# Patient Record
Sex: FEMALE | Race: WHITE | NOT HISPANIC OR LATINO | URBAN - METROPOLITAN AREA
[De-identification: names, ages, dates, MRNs, and addresses within clinical notes are randomized per-mention and may not be internally consistent; named-entity substitution may affect disease eponyms.]

---

## 2017-01-30 ENCOUNTER — APPOINTMENT (RX ONLY)
Dept: URBAN - METROPOLITAN AREA CLINIC 74 | Facility: CLINIC | Age: 72
Setting detail: DERMATOLOGY
End: 2017-01-30

## 2017-01-30 DIAGNOSIS — L30.8 OTHER SPECIFIED DERMATITIS: ICD-10-CM

## 2017-01-30 PROBLEM — L30.9 DERMATITIS, UNSPECIFIED: Status: ACTIVE | Noted: 2017-01-30

## 2017-01-30 PROCEDURE — 99213 OFFICE O/P EST LOW 20 MIN: CPT

## 2017-01-30 PROCEDURE — ? PRESCRIPTION

## 2017-01-30 PROCEDURE — ? TREATMENT REGIMEN

## 2017-01-30 PROCEDURE — ? COUNSELING

## 2017-01-30 RX ORDER — FLURANDRENOLIDE 4 UG/CM2
TAPE TOPICAL
Qty: 1 | Refills: 1 | Status: ERX | COMMUNITY
Start: 2017-01-30

## 2017-01-30 RX ADMIN — FLURANDRENOLIDE: 4 TAPE TOPICAL at 19:54

## 2017-01-30 ASSESSMENT — LOCATION SIMPLE DESCRIPTION DERM
LOCATION SIMPLE: LEFT INDEX FINGER
LOCATION SIMPLE: LEFT RING FINGER
LOCATION SIMPLE: RIGHT RING FINGER
LOCATION SIMPLE: RIGHT INDEX FINGER
LOCATION SIMPLE: LEFT MIDDLE FINGER
LOCATION SIMPLE: RIGHT THUMB
LOCATION SIMPLE: LEFT THUMB
LOCATION SIMPLE: RIGHT MIDDLE FINGER

## 2017-01-30 ASSESSMENT — LOCATION DETAILED DESCRIPTION DERM
LOCATION DETAILED: RIGHT DISTAL RADIAL THUMB
LOCATION DETAILED: LEFT RING FINGERTIP
LOCATION DETAILED: RIGHT RING FINGERTIP
LOCATION DETAILED: RIGHT INDEX FINGERTIP
LOCATION DETAILED: LEFT DISTAL RADIAL THUMB
LOCATION DETAILED: LEFT MIDDLE FINGERTIP
LOCATION DETAILED: RIGHT MIDDLE FINGERTIP
LOCATION DETAILED: LEFT INDEX FINGERTIP

## 2017-01-30 ASSESSMENT — LOCATION ZONE DERM: LOCATION ZONE: FINGER

## 2017-01-30 NOTE — HPI: RASH (HAND DERMATITIS)
How Severe Is It?: moderate
Is This A New Presentation, Or A Follow-Up?: Follow Up Hand Dermatitis
Additional History: Pt states hand dermatitis is better but not gone.  (post-op) Extensive Labs 12/16 [facility] - cbc, tsh, cmp (sl elevated transaminase=43), likely false + BEN 1:80 low titer, neg antimitochondrial abs, neg hep b,c studies, iron studies wnl, etc. - see attachments).

## 2017-01-30 NOTE — PROCEDURE: TREATMENT REGIMEN
Plan: May consider bx when flared, no active rash today to perform bx. Pt denies picking/itching/peeling skin, Pt denies any major anxiety other than 's seizures and ? [facility]?, no changes on medications
Initiate Treatment: Excipial 20% cream QD, Cordran tape qhs x 1 week when flared, if Cordran tape not covered may use clobetasol BID x 1 week at the time
Detail Level: Zone
Continue Regimen: Vaseline/aquaphor QHS